# Patient Record
Sex: MALE | Race: WHITE | HISPANIC OR LATINO | Employment: FULL TIME | URBAN - METROPOLITAN AREA
[De-identification: names, ages, dates, MRNs, and addresses within clinical notes are randomized per-mention and may not be internally consistent; named-entity substitution may affect disease eponyms.]

---

## 2023-05-20 ENCOUNTER — HOSPITAL ENCOUNTER (EMERGENCY)
Facility: HOSPITAL | Age: 28
Discharge: HOME | End: 2023-05-20
Attending: EMERGENCY MEDICINE

## 2023-05-20 VITALS
OXYGEN SATURATION: 99 % | BODY MASS INDEX: 27.87 KG/M2 | HEIGHT: 71 IN | RESPIRATION RATE: 18 BRPM | TEMPERATURE: 98 F | WEIGHT: 199.06 LBS | DIASTOLIC BLOOD PRESSURE: 89 MMHG | SYSTOLIC BLOOD PRESSURE: 139 MMHG | HEART RATE: 76 BPM

## 2023-05-20 DIAGNOSIS — S00.83XA TRAUMATIC HEMATOMA OF FOREHEAD, INITIAL ENCOUNTER: Primary | ICD-10-CM

## 2023-05-20 DIAGNOSIS — S01.81XA LACERATION OF FOREHEAD, INITIAL ENCOUNTER: ICD-10-CM

## 2023-05-20 PROCEDURE — 99285 EMERGENCY DEPT VISIT HI MDM: CPT | Mod: 25

## 2023-05-20 PROCEDURE — 12011 RPR F/E/E/N/L/M 2.5 CM/<: CPT

## 2023-05-20 PROCEDURE — 90471 IMMUNIZATION ADMIN: CPT

## 2023-05-20 PROCEDURE — 63600175 PHARM REV CODE 636 W HCPCS

## 2023-05-20 PROCEDURE — 90715 TDAP VACCINE 7 YRS/> IM: CPT

## 2023-05-20 PROCEDURE — 25000003 PHARM REV CODE 250: Performed by: PHYSICIAN ASSISTANT

## 2023-05-20 RX ORDER — HYDROCODONE BITARTRATE AND ACETAMINOPHEN 5; 325 MG/1; MG/1
1 TABLET ORAL EVERY 8 HOURS PRN
Qty: 9 TABLET | Refills: 0 | Status: SHIPPED | OUTPATIENT
Start: 2023-05-20 | End: 2023-05-23

## 2023-05-20 RX ORDER — HYDROCODONE BITARTRATE AND ACETAMINOPHEN 5; 325 MG/1; MG/1
1 TABLET ORAL
Status: COMPLETED | OUTPATIENT
Start: 2023-05-20 | End: 2023-05-20

## 2023-05-20 RX ORDER — CEPHALEXIN 500 MG/1
500 CAPSULE ORAL EVERY 12 HOURS
Qty: 10 CAPSULE | Refills: 0 | Status: SHIPPED | OUTPATIENT
Start: 2023-05-20 | End: 2023-05-25

## 2023-05-20 RX ORDER — HYDROCODONE BITARTRATE AND ACETAMINOPHEN 5; 325 MG/1; MG/1
1 TABLET ORAL EVERY 8 HOURS PRN
Qty: 9 TABLET | Refills: 0 | Status: SHIPPED | OUTPATIENT
Start: 2023-05-20 | End: 2023-05-20 | Stop reason: CLARIF

## 2023-05-20 RX ADMIN — TETANUS TOXOID, REDUCED DIPHTHERIA TOXOID AND ACELLULAR PERTUSSIS VACCINE, ADSORBED 0.5 ML: 5; 2.5; 8; 8; 2.5 SUSPENSION INTRAMUSCULAR at 09:05

## 2023-05-20 RX ADMIN — HYDROCODONE BITARTRATE AND ACETAMINOPHEN 1 TABLET: 5; 325 TABLET ORAL at 09:05

## 2023-05-20 NOTE — Clinical Note
"Vidal"Vidal" Phong was seen and treated in our emergency department on 5/20/2023.  He may return to work on 05/22/2023.       If you have any questions or concerns, please don't hesitate to call.      Kd Forbes PA-C"

## 2023-05-21 NOTE — ED PROVIDER NOTES
Encounter Date: 5/20/2023       History     Chief Complaint   Patient presents with    Head Injury     Was setting up tent, one of the poles struck him in the forehead.  Laceration to right forehead, no active bleeding. Denies LOC. Does have headache.      27-year-old male presents to the ED with contusion and laceration to right forehead secondary to being hit in the head with a pole while at work.  Patient was sitting up a tent, when the pole came off of its connection, hit him in the forehead.  Denies LOC, nausea, vomiting, dizziness, neck pain, blurred vision.  Patient does have a headache.  Tetanus is not up-to-date.    The history is provided by the patient and a friend. The history is limited by a language barrier. A  was used.   Review of patient's allergies indicates:  No Known Allergies  No past medical history on file.  No past surgical history on file.  No family history on file.     Review of Systems   Constitutional:  Negative for fever.   HENT:  Negative for sore throat.    Respiratory:  Negative for shortness of breath.    Cardiovascular:  Negative for chest pain.   Gastrointestinal:  Negative for nausea.   Genitourinary:  Negative for dysuria.   Musculoskeletal:  Negative for back pain.   Skin:  Positive for wound. Negative for rash.   Neurological:  Positive for headaches. Negative for weakness.   Hematological:  Does not bruise/bleed easily.   All other systems reviewed and are negative.    Physical Exam     Initial Vitals [05/20/23 1902]   BP Pulse Resp Temp SpO2   (!) 142/89 89 20 98.2 °F (36.8 °C) 99 %      MAP       --         Physical Exam    Nursing note and vitals reviewed.  Constitutional: He appears well-developed and well-nourished.   HENT:   Head: Normocephalic and atraumatic. Head is without right periorbital erythema and without left periorbital erythema.       Eyes: EOM are normal. Pupils are equal, round, and reactive to light.   Neck: Neck supple.   Normal range  of motion.   Full passive range of motion without pain.     Cardiovascular:  Normal rate, regular rhythm and normal heart sounds.           Pulmonary/Chest: Breath sounds normal.   Musculoskeletal:         General: Normal range of motion.      Cervical back: Full passive range of motion without pain, normal range of motion and neck supple.     Neurological: He is alert and oriented to person, place, and time. He has normal strength. GCS score is 15. GCS eye subscore is 4. GCS verbal subscore is 5. GCS motor subscore is 6.   Skin: Skin is warm and dry.   Psychiatric: He has a normal mood and affect.       ED Course   Lac Repair    Date/Time: 5/20/2023 10:08 PM  Performed by: Kd Forbes PA-C  Authorized by: Kd Forbes PA-C     Consent:     Consent obtained:  Verbal    Consent given by:  Patient    Risks, benefits, and alternatives were discussed: yes      Risks discussed:  Infection and poor cosmetic result    Alternatives discussed:  No treatment  Universal protocol:     Procedure explained and questions answered to patient or proxy's satisfaction: yes      Patient identity confirmed:  Verbally with patient and arm band  Anesthesia:     Anesthesia method:  Local infiltration    Local anesthetic:  Lidocaine 1% w/o epi  Laceration details:     Location:  Face    Face location:  Forehead    Length (cm):  2  Exploration:     Hemostasis achieved with:  Direct pressure    Imaging outcome: foreign body not noted      Contaminated: no    Treatment:     Area cleansed with:  Povidone-iodine    Amount of cleaning:  Standard    Irrigation solution:  Sterile saline    Irrigation method:  Syringe    Visualized foreign bodies/material removed: no      Debridement:  None    Undermining:  None    Scar revision: no    Skin repair:     Repair method:  Sutures    Suture size:  5-0    Suture material:  Nylon    Suture technique:  Simple interrupted    Number of sutures:  4  Approximation:     Approximation:  Close  Repair  type:     Repair type:  Simple  Post-procedure details:     Dressing:  Non-adherent dressing    Procedure completion:  Tolerated well, no immediate complications  Labs Reviewed - No data to display       Imaging Results              CT Maxillofacial Without Contrast (Final result)  Result time 05/20/23 20:48:17      Final result by Isaias Thornton MD (05/20/23 20:48:17)                   Impression:      No posttraumatic abnormality of the max face.      Electronically signed by: Isaias Thornton MD  Date:    05/20/2023  Time:    20:48               Narrative:    EXAMINATION:  CT MAXILLOFACIAL WITHOUT CONTRAST    CLINICAL HISTORY:  Maxillofacial pain;    TECHNIQUE:  Axial images of the max face were obtained without IV contrast administration.  Coronal and sagittal reconstructions were provided.  Three dimensional and MIP images were obtained and evaluated.  Total DLP was 527 mGy-cm. Dose lowering technique and automated exposure control were utilized for this exam.    COMPARISON:  None    FINDINGS:  The included intracranial contents are normal.  There is no hemorrhage, hydrocephalus, or midline shift.  The calvarium is intact.  The bilateral orbits are normal.  The nasal bones are intact.  The nasal septum is intact.  The zygomatic arch is intact.  The TMJ are well aligned.  The mandible is intact.  The nasopharynx and oropharynx are widely patent.  The parotid and submandibular glands are normal.                                       CT Head Without Contrast (Final result)  Result time 05/20/23 20:47:22      Final result by Isaias Thornton MD (05/20/23 20:47:22)                   Impression:      Small right frontal scalp hematoma without underlying fracture or acute intracranial abnormality.      Electronically signed by: Isaias Thornton MD  Date:    05/20/2023  Time:    20:47               Narrative:    EXAMINATION:  CT HEAD WITHOUT CONTRAST    CLINICAL HISTORY:  Polytrauma, blunt;    TECHNIQUE:  Axial images of the head were  obtained without IV contrast administration.  Coronal and sagittal reconstructions were provided.  Three dimensional and MIP images were obtained and evaluated.  Total DLP was 947 mGy-cm. Dose lowering technique and automated exposure control were utilized for this exam.    COMPARISON:  None    FINDINGS:  There is normal brain formation.  There is normal gray-white matter differentiation.  There is no hemorrhage, hydrocephalus, or midline shift.  There is no cytotoxic or vasogenic edema.  There is no intra or extra-axial fluid collection.  There is no herniation.    There is a right frontal scalp hematoma.  There is no underlying fracture.  The bilateral orbits are normal.  The paranasal sinuses are free of disease.                                       Medications   Tdap (BOOSTRIX) vaccine injection 0.5 mL (0.5 mLs Intramuscular Given 5/20/23 2102)   HYDROcodone-acetaminophen 5-325 mg per tablet 1 tablet (1 tablet Oral Given 5/20/23 2145)     Medical Decision Making:   Initial Assessment:   27-year-old male presents to the ED with contusion and laceration to right forehead secondary to being hit in the head with a pole while at work.  Patient was sitting up a tent, when the pole came off of its connection, hit him in the forehead.  Denies LOC, nausea, vomiting, dizziness, neck pain, blurred vision.  Patient does have a headache.  Tetanus is not up-to-date.    Differential Diagnosis:   Contusion, fracture, intracranial hemorrhage, laceration, hematoma  Clinical Tests:   Radiological Study: Reviewed and Ordered                        Clinical Impression:   Final diagnoses:  [S00.83XA] Traumatic hematoma of forehead, initial encounter (Primary)  [S01.81XA] Laceration of forehead, initial encounter        ED Disposition Condition    Discharge Stable          ED Prescriptions       Medication Sig Dispense Start Date End Date Auth. Provider    HYDROcodone-acetaminophen (NORCO) 5-325 mg per tablet Take 1 tablet by mouth  every 8 (eight) hours as needed for Pain. 9 tablet 5/20/2023 5/23/2023 Kd Forbes PA-C    cephALEXin (KEFLEX) 500 MG capsule Take 1 capsule (500 mg total) by mouth every 12 (twelve) hours. for 5 days 10 capsule 5/20/2023 5/25/2023 Kd Forbes PA-C          Follow-up Information       Follow up With Specialties Details Why Contact Info    [facility] - Emergency Dept Emergency Medicine In 1 week If symptoms worsen, For suture removal in 5 days [address] [facility] [place] [id number] [phone]    Primary care provider  In 2 days As needed, For suture removal in 5 days              Kd Forbes PA-C  05/20/23 2624